# Patient Record
Sex: FEMALE | Race: WHITE | NOT HISPANIC OR LATINO | Employment: OTHER | URBAN - METROPOLITAN AREA
[De-identification: names, ages, dates, MRNs, and addresses within clinical notes are randomized per-mention and may not be internally consistent; named-entity substitution may affect disease eponyms.]

---

## 2023-09-25 ENCOUNTER — TELEPHONE (OUTPATIENT)
Dept: GASTROENTEROLOGY | Facility: CLINIC | Age: 78
End: 2023-09-25

## 2023-09-25 NOTE — TELEPHONE ENCOUNTER
----- Message from Jayla Nielsen sent at 9/25/2023  2:00 PM CDT -----  Regarding: jluis  Contact: self  Pt needs to schedule colonoscopy pls call [phone] with appt details

## 2023-10-09 DIAGNOSIS — K92.1 MELENA: Primary | ICD-10-CM

## 2023-10-24 RX ORDER — ATORVASTATIN CALCIUM 10 MG/1
TABLET, FILM COATED ORAL
COMMUNITY
Start: 2023-10-06

## 2023-10-24 RX ORDER — OMEPRAZOLE 20 MG/1
CAPSULE, DELAYED RELEASE ORAL
COMMUNITY
Start: 2023-10-06

## 2023-10-24 RX ORDER — HYDRALAZINE HYDROCHLORIDE 50 MG/1
TABLET, FILM COATED ORAL
COMMUNITY
Start: 2023-10-06

## 2023-10-24 RX ORDER — GABAPENTIN 300 MG/1
CAPSULE ORAL
COMMUNITY
Start: 2023-10-09

## 2023-10-24 RX ORDER — LEVOTHYROXINE SODIUM 137 UG/1
TABLET ORAL
COMMUNITY
Start: 2023-10-06

## 2023-10-24 RX ORDER — METOPROLOL SUCCINATE 100 MG/1
TABLET, EXTENDED RELEASE ORAL
COMMUNITY
Start: 2023-10-06

## 2023-10-24 NOTE — PROGRESS NOTES
Clinic Note    Reason for visit:  The primary encounter diagnosis was History of colon polyps. Diagnoses of Melena and Gastroesophageal reflux disease, unspecified whether esophagitis present were also pertinent to this visit.    PCP: Marcia Pulliam   [address] [facility] [address] / [place] [address]    HPI:  This is a 78 y.o. female who is established. Referred for Melena. She states she had COVID 9/2023 and was given paxlovid and prednisone. She had black stool and diarrhea at this time. She went to ER at this time due to the black stool. Denies pepto or NSAIDs at this time. Had CT scan and bloodwork in ER which was normal per pt. She has no more black stools since stopping prednisone.  On omeprazole 20mg daily. Denies reflux. She has BM every 1-2  days. Denies any abdominal pain. Denies F/H CRC.     10/22/2023 H/H 12.1/39.8     10/12/2022 CBC- nl. eGFR 61L    10/19/2018 Colonoscopy-polyp ok-repeat colon in 5 years.    Review of Systems   Constitutional:  Negative for fatigue, fever and unexpected weight change.   HENT:  Negative for mouth sores, postnasal drip, sore throat and trouble swallowing.    Eyes:  Negative for pain, discharge and eye dryness.   Respiratory:  Negative for apnea, cough, choking, chest tightness, shortness of breath and wheezing.    Cardiovascular:  Negative for chest pain, palpitations and leg swelling.   Gastrointestinal:  Negative for abdominal distention, abdominal pain, anal bleeding, blood in stool, change in bowel habit, constipation, diarrhea, nausea, rectal pain, vomiting, reflux and fecal incontinence.   Genitourinary:  Negative for bladder incontinence, dysuria and hematuria.   Musculoskeletal:  Negative for arthralgias, back pain and joint swelling.   Integumentary:  Negative for color change and rash.   Allergic/Immunologic: Negative for environmental allergies and food allergies.   Neurological:  Negative for seizures and headaches.   Hematological:  Negative for adenopathy. Does  "not bruise/bleed easily.        Past Medical History:   Diagnosis Date    Chronic kidney disease, unspecified     Essential (primary) hypertension     GERD (gastroesophageal reflux disease)     Neuropathy     Personal history of colonic polyps     Post-nasal drip      Past Surgical History:   Procedure Laterality Date    CHOLECYSTECTOMY  2020    COLONOSCOPY  2018    HYSTERECTOMY  1979    LAMINECTOMY  2021    SPINAL CORD STIMULATOR IMPLANT  09/2022    TONSILLECTOMY  1966     Family History   Problem Relation Age of Onset    Alzheimer's disease Mother     Pancreatic cancer Father     Bone cancer Sister     Lung cancer Brother     Colon cancer Neg Hx     Rectal cancer Neg Hx     Crohn's disease Neg Hx     Ulcerative colitis Neg Hx     Stomach cancer Neg Hx     Esophageal cancer Neg Hx     Liver disease Neg Hx     Liver cancer Neg Hx      Social History     Tobacco Use    Smoking status: Never    Smokeless tobacco: Never   Substance Use Topics    Alcohol use: Yes     Alcohol/week: 3.0 standard drinks of alcohol     Types: 3 Shots of liquor per week    Drug use: Never     Review of patient's allergies indicates:   Allergen Reactions    Fenofibrate Itching    Spironolactone Diarrhea      Medication List with Changes/Refills   New Medications    POLYETHYLENE GLYCOL (GOLYTELY) 236-22.74-6.74 -5.86 GRAM SUSPENSION    Take 4,000 mLs (4 L total) by mouth once. for 1 dose   Current Medications    ASPIRIN (ECOTRIN) 81 MG EC TABLET    Take 81 mg by mouth once daily.    ATORVASTATIN (LIPITOR) 10 MG TABLET        CETIRIZINE (ZYRTEC) 5 MG TABLET    Take 5 mg by mouth once daily.    GABAPENTIN (NEURONTIN) 300 MG CAPSULE        HYDRALAZINE (APRESOLINE) 50 MG TABLET        LEVOTHYROXINE (SYNTHROID) 137 MCG TAB TABLET        METOPROLOL SUCCINATE (TOPROL-XL) 100 MG 24 HR TABLET        OMEPRAZOLE (PRILOSEC) 20 MG CAPSULE             Vital Signs:  BP (!) 162/69   Pulse 68   Ht 5' 4" (1.626 m)   Wt 82.2 kg (181 lb 3.2 oz)   SpO2 98%   " BMI 31.10 kg/m²        Physical Exam  Vitals reviewed.   Constitutional:       General: She is awake. She is not in acute distress.     Appearance: Normal appearance. She is well-developed. She is not ill-appearing, toxic-appearing or diaphoretic.   HENT:      Head: Normocephalic and atraumatic.      Nose: Nose normal.      Mouth/Throat:      Mouth: Mucous membranes are moist.      Pharynx: Oropharynx is clear. No oropharyngeal exudate or posterior oropharyngeal erythema.   Eyes:      General: Lids are normal. Gaze aligned appropriately. No scleral icterus.        Right eye: No discharge.         Left eye: No discharge.      Conjunctiva/sclera: Conjunctivae normal.   Neck:      Trachea: Trachea normal.   Cardiovascular:      Rate and Rhythm: Normal rate and regular rhythm.      Pulses:           Radial pulses are 2+ on the right side and 2+ on the left side.   Pulmonary:      Effort: Pulmonary effort is normal. No respiratory distress.      Breath sounds: No stridor. No wheezing.   Chest:      Chest wall: No tenderness.   Abdominal:      General: Bowel sounds are normal. There is no distension.      Palpations: Abdomen is soft. There is no fluid wave, hepatomegaly or mass.      Tenderness: There is no abdominal tenderness. There is no guarding or rebound.   Musculoskeletal:         General: No tenderness or deformity.      Cervical back: Full passive range of motion without pain and neck supple. No tenderness.      Right lower leg: No edema.      Left lower leg: No edema.   Lymphadenopathy:      Cervical: No cervical adenopathy.   Skin:     General: Skin is warm and dry.      Capillary Refill: Capillary refill takes less than 2 seconds.      Coloration: Skin is not cyanotic, jaundiced or pale.   Neurological:      General: No focal deficit present.      Mental Status: She is alert and oriented to person, place, and time.      Motor: No tremor.   Psychiatric:         Attention and Perception: Attention normal.          Mood and Affect: Mood and affect normal.         Speech: Speech normal.         Behavior: Behavior normal. Behavior is cooperative.            All of the data above and below has been reviewed by myself and any further interpretations will be reflected in the assessment and plan.   The data includes review of external notes, and independent interpretation of lab results, procedures, x-rays, and imaging reports.      Assessment:  History of colon polyps  -     Ambulatory Referral to External Surgery  -     polyethylene glycol (GOLYTELY) 236-22.74-6.74 -5.86 gram suspension; Take 4,000 mLs (4 L total) by mouth once. for 1 dose  Dispense: 4000 mL; Refill: 0    Melena  Comments:  after COVID. No more episodes since 9/2023  Orders:  -     Ambulatory referral/consult to Gastroenterology    Gastroesophageal reflux disease, unspecified whether esophagitis present    Melena- after treatment for COVID. Has since resolved. CBC WNL. Discussed to notify if returns and will plan upper endoscopy.  Hx colon polyps- due for colonoscopy.   Per [facility]- Ok to schedule colonoscopies with Dr. Metzger in 2024. Patient aware of jail.     Recommendations:    Schedule colonoscopy with Dr. Metzger.      Risks, benefits, and alternatives of medical management, any associated procedures, and/or treatment discussed with the patient. Patient given opportunity to ask questions and voices understanding. Patient has elected to proceed with the recommended care modalities as discussed.    Follow up if symptoms worsen or fail to improve.    Order summary:  Orders Placed This Encounter   Procedures    Ambulatory Referral to External Surgery        Instructed patient to notify my office if they have not been contacted within two weeks after any procedures, submitting any samples (biopsies, blood, stool, urine, etc.) or after any imaging (X-ray, CT, MRI, etc.).      Lynn Villasenor NP    This document may have been created using a voice  recognition transcribing system. Incorrect words or phrases may have been missed during proofreading. Please interpret accordingly or contact me for clarification.

## 2023-10-25 ENCOUNTER — OFFICE VISIT (OUTPATIENT)
Dept: GASTROENTEROLOGY | Facility: CLINIC | Age: 78
End: 2023-10-25
Payer: MEDICARE

## 2023-10-25 VITALS
HEIGHT: 64 IN | SYSTOLIC BLOOD PRESSURE: 162 MMHG | DIASTOLIC BLOOD PRESSURE: 69 MMHG | WEIGHT: 181.19 LBS | BODY MASS INDEX: 30.93 KG/M2 | HEART RATE: 68 BPM | OXYGEN SATURATION: 98 %

## 2023-10-25 DIAGNOSIS — K21.9 GASTROESOPHAGEAL REFLUX DISEASE, UNSPECIFIED WHETHER ESOPHAGITIS PRESENT: ICD-10-CM

## 2023-10-25 DIAGNOSIS — K92.1 MELENA: ICD-10-CM

## 2023-10-25 DIAGNOSIS — Z86.010 HISTORY OF COLON POLYPS: Primary | ICD-10-CM

## 2023-10-25 PROCEDURE — 99204 PR OFFICE/OUTPT VISIT, NEW, LEVL IV, 45-59 MIN: ICD-10-PCS | Mod: S$GLB,,, | Performed by: NURSE PRACTITIONER

## 2023-10-25 PROCEDURE — 99204 OFFICE O/P NEW MOD 45 MIN: CPT | Mod: S$GLB,,, | Performed by: NURSE PRACTITIONER

## 2023-10-25 RX ORDER — ASPIRIN 81 MG/1
81 TABLET ORAL DAILY
COMMUNITY

## 2023-10-25 RX ORDER — CETIRIZINE HYDROCHLORIDE 5 MG/1
5 TABLET ORAL DAILY
COMMUNITY

## 2023-10-25 RX ORDER — POLYETHYLENE GLYCOL 3350, SODIUM SULFATE ANHYDROUS, SODIUM BICARBONATE, SODIUM CHLORIDE, POTASSIUM CHLORIDE 236; 22.74; 6.74; 5.86; 2.97 G/4L; G/4L; G/4L; G/4L; G/4L
4 POWDER, FOR SOLUTION ORAL ONCE
Qty: 4000 ML | Refills: 0 | Status: SHIPPED | OUTPATIENT
Start: 2023-10-25 | End: 2023-10-25

## 2023-10-25 NOTE — Clinical Note
This is one of the patients I tried to call to let her know that you were retiring. No answer. She came in for OV due to melena last month. Has since resolved. CBC wnl.  Order colonoscopy and explained that it may be done with different MD. Told us to notify if melena returns. Would you recommend upper endoscopy anyways?

## 2023-10-25 NOTE — PATIENT INSTRUCTIONS
Schedule colonoscopy with Dr. Metzger.    Please notify my office if you have not been contacted within two weeks after any procedures, submitting any samples (biopsies, blood, stool, urine, etc.) or after any imaging (X-ray, CT, MRI, etc.).

## 2023-10-31 DIAGNOSIS — Z86.010 HISTORY OF COLON POLYPS: ICD-10-CM

## 2023-10-31 DIAGNOSIS — K21.9 GASTROESOPHAGEAL REFLUX DISEASE, UNSPECIFIED WHETHER ESOPHAGITIS PRESENT: ICD-10-CM

## 2023-10-31 DIAGNOSIS — K92.1 MELENA: Primary | ICD-10-CM

## 2023-10-31 NOTE — TELEPHONE ENCOUNTER
Discussed with Dr. Metzger. Needs an EGD with colonoscopy due to melena. She may be moved up to  11/14 or 11/23- whichever is best for patient. New order signed for both EGD and Colonoscopy.  LETY

## 2023-11-09 ENCOUNTER — DOCUMENTATION ONLY (OUTPATIENT)
Dept: GASTROENTEROLOGY | Facility: CLINIC | Age: 78
End: 2023-11-09
Payer: MEDICARE

## 2023-11-09 ENCOUNTER — TELEPHONE (OUTPATIENT)
Dept: GASTROENTEROLOGY | Facility: CLINIC | Age: 78
End: 2023-11-09
Payer: MEDICARE

## 2023-11-09 DIAGNOSIS — K92.1 MELENA: Primary | ICD-10-CM

## 2023-11-09 DIAGNOSIS — K21.9 GASTROESOPHAGEAL REFLUX DISEASE, UNSPECIFIED WHETHER ESOPHAGITIS PRESENT: ICD-10-CM

## 2023-11-09 DIAGNOSIS — Z86.010 HISTORY OF COLON POLYPS: ICD-10-CM

## 2023-11-09 RX ORDER — POLYETHYLENE GLYCOL 3350, SODIUM SULFATE ANHYDROUS, SODIUM BICARBONATE, SODIUM CHLORIDE, POTASSIUM CHLORIDE 236; 22.74; 6.74; 5.86; 2.97 G/4L; G/4L; G/4L; G/4L; G/4L
4 POWDER, FOR SOLUTION ORAL ONCE
Qty: 4000 ML | Refills: 0 | Status: SHIPPED | OUTPATIENT
Start: 2023-11-09 | End: 2023-11-09

## 2023-11-09 NOTE — PROGRESS NOTES
[place] - Gastroenterology  [address] [place] [address]  Phone: [phone]  Fax: [phone]    History & Physical         Provider: Dr. Onesimo Metzger    Patient Name: Amelia RIZVI (age):1945  78 y.o.           Gender: female   Phone: [phone]     Referring Physician: Marcia Pulliam     Vital Signs:   Height - 5'4  Weight - 181 lbs  BMI -  31.10 kg    Plan: EGD/COLONOSCOPY @ [facility] Start IV - [place] [place]    K92.1 (ICD-10-CM) - Melena   K21.9 (ICD-10-CM) - Gastroesophageal reflux disease, unspecified whether esophagitis present   Z86.010 (ICD-10-CM) - History of colon polyps         History:      Past Medical History:   Diagnosis Date    Chronic kidney disease, unspecified     Essential (primary) hypertension     GERD (gastroesophageal reflux disease)     Neuropathy     Personal history of colonic polyps     Post-nasal drip       Past Surgical History:   Procedure Laterality Date    CHOLECYSTECTOMY      COLONOSCOPY      HYSTERECTOMY  1979    LAMINECTOMY      SPINAL CORD STIMULATOR IMPLANT  2022    TONSILLECTOMY  1966      Medication List with Changes/Refills   Current Medications    ASPIRIN (ECOTRIN) 81 MG EC TABLET    Take 81 mg by mouth once daily.    ATORVASTATIN (LIPITOR) 10 MG TABLET        CETIRIZINE (ZYRTEC) 5 MG TABLET    Take 5 mg by mouth once daily.    GABAPENTIN (NEURONTIN) 300 MG CAPSULE        HYDRALAZINE (APRESOLINE) 50 MG TABLET        LEVOTHYROXINE (SYNTHROID) 137 MCG TAB TABLET        METOPROLOL SUCCINATE (TOPROL-XL) 100 MG 24 HR TABLET        OMEPRAZOLE (PRILOSEC) 20 MG CAPSULE          Review of patient's allergies indicates:   Allergen Reactions    Fenofibrate Itching    Spironolactone Diarrhea      Family History   Problem Relation Age of Onset    Alzheimer's disease Mother     Pancreatic cancer Father     Bone cancer Sister     Lung cancer Brother     Colon cancer Neg  Hx     Rectal cancer Neg Hx     Crohn's disease Neg Hx     Ulcerative colitis Neg Hx     Stomach cancer Neg Hx     Esophageal cancer Neg Hx     Liver disease Neg Hx     Liver cancer Neg Hx       Social History     Tobacco Use    Smoking status: Never    Smokeless tobacco: Never   Substance Use Topics    Alcohol use: Yes     Alcohol/week: 3.0 standard drinks of alcohol     Types: 3 Shots of liquor per week    Drug use: Never        Physical Examination:     General Appearance:___________________________  HEENT: _____________________________________  Abdomen:____________________________________  Heart:________________________________________  Lungs:_______________________________________  Extremities:___________________________________  Skin:_________________________________________  Endocrine:____________________________________  Genitourinary:_________________________________  Neurological:__________________________________      Patient has been evaluated immediately prior to sedation and is medically cleared for endoscopy with IVCS as an ASA class: ______      Physician Signature: _________________________       Date: ________  Time: ________

## 2023-11-13 NOTE — TELEPHONE ENCOUNTER
S/w pt and told her that I was calling as a courtesy regarding his upcoming EGD/colonoscopy for [facility]MM Tomorrow (Tues). I asked if the pt had his prep instructions and meds. Pt stated, yes. Pt has declined to have the EGD performed. Pt stated that her issues have resolved and no longer needs to have EGD. I told her that I would make sure the hospital has the correct orders.     I informed her that [facility] will call today with her arrival time and that the GI Lab is located on the third floor. Pt acknowledge that he understood and does not have any further questions. evie